# Patient Record
Sex: FEMALE | URBAN - METROPOLITAN AREA
[De-identification: names, ages, dates, MRNs, and addresses within clinical notes are randomized per-mention and may not be internally consistent; named-entity substitution may affect disease eponyms.]

---

## 2019-11-19 ENCOUNTER — IMPORTED ENCOUNTER (OUTPATIENT)
Dept: URBAN - METROPOLITAN AREA CLINIC 1 | Facility: CLINIC | Age: 44
End: 2019-11-19

## 2019-11-19 PROBLEM — H04.123: Noted: 2019-11-19

## 2019-11-19 PROBLEM — H16.143: Noted: 2019-11-19

## 2019-11-19 PROCEDURE — 92002 INTRM OPH EXAM NEW PATIENT: CPT

## 2019-11-19 NOTE — PATIENT DISCUSSION
1. [place] w/ PEK OU OD>OS: Begin Zylet TID OU (Rx sent to pharmacy today). Begin PF Systane Ultra Q2hrs OU w/a. Will recheck patient in 1 week. Return for an appointment in 1 week 10 (Recheck K) with Dr. Ioana Harvey.

## 2022-04-02 ASSESSMENT — TONOMETRY
OS_IOP_MMHG: 15
OD_IOP_MMHG: 15

## 2022-04-02 ASSESSMENT — VISUAL ACUITY
OS_CC: 20/20
OD_CC: 20/20